# Patient Record
Sex: MALE | Race: WHITE | Employment: FULL TIME | URBAN - METROPOLITAN AREA
[De-identification: names, ages, dates, MRNs, and addresses within clinical notes are randomized per-mention and may not be internally consistent; named-entity substitution may affect disease eponyms.]

---

## 2021-11-13 ENCOUNTER — HOSPITAL ENCOUNTER (EMERGENCY)
Age: 56
Discharge: HOME | End: 2021-11-13
Payer: OTHER GOVERNMENT

## 2021-11-13 VITALS
BODY MASS INDEX: 29.56 KG/M2 | DIASTOLIC BLOOD PRESSURE: 11 MMHG | OXYGEN SATURATION: 95 % | SYSTOLIC BLOOD PRESSURE: 153 MMHG | HEART RATE: 114 BPM | WEIGHT: 218.23 LBS | RESPIRATION RATE: 18 BRPM | TEMPERATURE: 97.7 F | HEIGHT: 72 IN

## 2021-11-13 DIAGNOSIS — M10.9 ACUTE GOUT INVOLVING TOE OF LEFT FOOT, UNSPECIFIED CAUSE: Primary | ICD-10-CM

## 2021-11-13 DIAGNOSIS — R03.0 ELEVATED BLOOD PRESSURE READING: ICD-10-CM

## 2021-11-13 PROCEDURE — 99284 EMERGENCY DEPT VISIT MOD MDM: CPT

## 2021-11-13 PROCEDURE — 6370000000 HC RX 637 (ALT 250 FOR IP): Performed by: PHYSICIAN ASSISTANT

## 2021-11-13 RX ORDER — COLCHICINE 0.6 MG/1
0.6 TABLET ORAL DAILY
Status: DISCONTINUED | OUTPATIENT
Start: 2021-11-13 | End: 2021-11-13 | Stop reason: HOSPADM

## 2021-11-13 RX ORDER — COLCHICINE 0.6 MG/1
TABLET ORAL
Qty: 1 TABLET | Refills: 0 | Status: SHIPPED | OUTPATIENT
Start: 2021-11-13

## 2021-11-13 RX ORDER — INDOMETHACIN 25 MG/1
50 CAPSULE ORAL ONCE
Status: COMPLETED | OUTPATIENT
Start: 2021-11-13 | End: 2021-11-13

## 2021-11-13 RX ORDER — METHYLPREDNISOLONE 4 MG/1
TABLET ORAL
Qty: 21 TABLET | Refills: 0 | Status: SHIPPED | OUTPATIENT
Start: 2021-11-13

## 2021-11-13 RX ORDER — INDOMETHACIN 50 MG/1
50 CAPSULE ORAL
Qty: 15 CAPSULE | Refills: 0 | Status: SHIPPED | OUTPATIENT
Start: 2021-11-13

## 2021-11-13 RX ADMIN — COLCHICINE 0.6 MG: 0.6 TABLET ORAL at 11:44

## 2021-11-13 RX ADMIN — INDOMETHACIN 50 MG: 25 CAPSULE ORAL at 11:44

## 2021-11-13 ASSESSMENT — PAIN - FUNCTIONAL ASSESSMENT
PAIN_FUNCTIONAL_ASSESSMENT: 0-10
PAIN_FUNCTIONAL_ASSESSMENT: PREVENTS OR INTERFERES SOME ACTIVE ACTIVITIES AND ADLS
PAIN_FUNCTIONAL_ASSESSMENT: PREVENTS OR INTERFERES SOME ACTIVE ACTIVITIES AND ADLS

## 2021-11-13 ASSESSMENT — PAIN DESCRIPTION - ORIENTATION
ORIENTATION: LEFT
ORIENTATION: LEFT

## 2021-11-13 ASSESSMENT — PAIN DESCRIPTION - PAIN TYPE
TYPE: ACUTE PAIN
TYPE: ACUTE PAIN

## 2021-11-13 ASSESSMENT — PAIN DESCRIPTION - FREQUENCY
FREQUENCY: CONTINUOUS
FREQUENCY: CONTINUOUS

## 2021-11-13 ASSESSMENT — PAIN SCALES - GENERAL
PAINLEVEL_OUTOF10: 2
PAINLEVEL_OUTOF10: 8
PAINLEVEL_OUTOF10: 8

## 2021-11-13 ASSESSMENT — PAIN DESCRIPTION - LOCATION
LOCATION: TOE (COMMENT WHICH ONE)
LOCATION: TOE (COMMENT WHICH ONE)

## 2021-11-13 ASSESSMENT — PAIN DESCRIPTION - ONSET
ONSET: ON-GOING
ONSET: ON-GOING

## 2021-11-13 ASSESSMENT — PAIN DESCRIPTION - PROGRESSION
CLINICAL_PROGRESSION: GRADUALLY WORSENING
CLINICAL_PROGRESSION: GRADUALLY IMPROVING

## 2021-11-13 ASSESSMENT — PAIN DESCRIPTION - DESCRIPTORS
DESCRIPTORS: ACHING;SHOOTING
DESCRIPTORS: ACHING;SHARP;SHOOTING;THROBBING

## 2021-11-13 NOTE — ED PROVIDER NOTES
especially with pushing on the area or with lifting the foot and better with scooting it along the ground. No acute ankle pain or knee pain. \"Positives and Pertinent negatives as per HPI\"    Physical Exam:  Physical Exam  Vitals and nursing note reviewed. Constitutional:       Appearance: Normal appearance. He is not diaphoretic. HENT:      Head: Normocephalic and atraumatic. Right Ear: External ear normal.      Left Ear: External ear normal.      Nose: Nose normal.   Eyes:      General:         Right eye: No discharge. Left eye: No discharge. Conjunctiva/sclera: Conjunctivae normal.   Pulmonary:      Effort: Pulmonary effort is normal. No respiratory distress. Musculoskeletal:         General: Swelling and tenderness present. Cervical back: Normal range of motion and neck supple. Feet:    Feet:      Comments: Diffuse inflammatory changes with no clear delineation, increased heat is present  Skin:     General: Skin is warm and dry. Findings: Erythema present. Neurological:      Mental Status: He is alert and oriented to person, place, and time. Psychiatric:         Mood and Affect: Mood normal.         Behavior: Behavior normal.         MEDICAL DECISION MAKING    Vitals:    Vitals:    11/13/21 1005   BP: (!) 153/11   Pulse: 114   Temp: 97.7 °F (36.5 °C)   SpO2: 95%   Weight: 218 lb 3.7 oz (99 kg)   Height: 6' (1.829 m)       LABS:Labs Reviewed - No data to display     Remainder of labs reviewed and were negative at this time or not returned at the time of this note. RADIOLOGY:   Non-plain film images such as CT, Ultrasound and MRI are read by the radiologist. Rafael Dey PA-C have directly visualized the radiologic plain film image(s) with the below findings:      Interpretation per the Radiologist below, if available at the time of this note:    No orders to display        No results found.        MEDICAL DECISION MAKING / ED COURSE:      PROCEDURES: Procedures    None    Patient was given:  Medications   indomethacin (INDOCIN) capsule 50 mg (has no administration in time range)   colchicine (COLCRYS) tablet 0.6 mg (has no administration in time range)       This patient presents as above and evaluation and treatment is begun here. Patient does have some local inflammation however this does not appear to be infection and there is no puncture or evidence of injury. He states that his symptoms are just like the last time that he had gout. We discussed this and how modifying diet might help. In meantime we will write for some medications to help out with pain and inflammation and try to get this gouty flareup to resolve. Patient verbalized understanding and agreed with the above and the following discharge home plan. The patient tolerated their visit well. I evaluated the patient. The physician was available for consultation as needed. The patient and / or the family were informed of the results of any tests, a time was given to answer questions, a plan was proposed and they agreed with plan. Home in good condition to use medications as written, diet restriction, drink plenty of water, and follow-up outpatient with your [place] doctor for further care and treatment if needed. Return to the emergency department for any emergency worsening or concern    CLINICAL IMPRESSION:  1. Acute gout involving toe of left foot, unspecified cause    2. Elevated blood pressure reading        DISPOSITION Decision To Discharge 11/13/2021 11:33:57 AM      PATIENT REFERRED TO:  [facility]    Call         DISCHARGE MEDICATIONS:  New Prescriptions    COLCHICINE (COLCRYS) 0.6 MG TABLET    One tablet 1 hour after discharge from emergency room.     INDOMETHACIN (INDOCIN) 50 MG CAPSULE    Take 1 capsule by mouth 3 times daily (with meals)    METHYLPREDNISOLONE (MEDROL, STEPHANIA,) 4 MG TABLET    Take 6 tabs by mouth day 1, 5 tabs day 2, 4 tabs day 3, 3 tabs day 4, 2 tabs day 5, and 1 tab day 6.       DISCONTINUED MEDICATIONS:  Discontinued Medications    No medications on file              (Please note the MDM and HPI sections of this note were completed with a voice recognition program.  Efforts were made to edit the dictations but occasionally words are mis-transcribed.)    Electronically signed, Maria Luz Dumont PA-C,          Maria Luz Dumont PA-C  11/13/21 9967